# Patient Record
Sex: MALE | URBAN - METROPOLITAN AREA
[De-identification: names, ages, dates, MRNs, and addresses within clinical notes are randomized per-mention and may not be internally consistent; named-entity substitution may affect disease eponyms.]

---

## 2019-03-01 ENCOUNTER — APPOINTMENT (OUTPATIENT)
Dept: URBAN - METROPOLITAN AREA CLINIC 319 | Age: 59
Setting detail: DERMATOLOGY
End: 2019-03-01

## 2019-03-01 DIAGNOSIS — L72.8 OTHER FOLLICULAR CYSTS OF THE SKIN AND SUBCUTANEOUS TISSUE: ICD-10-CM

## 2019-03-01 PROCEDURE — OTHER MIPS QUALITY: OTHER

## 2019-03-01 PROCEDURE — OTHER PRESCRIPTION: OTHER

## 2019-03-01 PROCEDURE — OTHER TREATMENT REGIMEN: OTHER

## 2019-03-01 PROCEDURE — OTHER INTRALESIONAL KENALOG: OTHER

## 2019-03-01 PROCEDURE — OTHER COUNSELING: OTHER

## 2019-03-01 PROCEDURE — 11900 INJECT SKIN LESIONS </W 7: CPT

## 2019-03-01 RX ORDER — SULFAMETHOXAZOLE AND TRIMETHOPRIM 800; 160 MG/1; MG/1
TABLET ORAL
Qty: 20 | Refills: 0 | Status: ERX | COMMUNITY
Start: 2019-03-01

## 2019-03-01 ASSESSMENT — LOCATION ZONE DERM: LOCATION ZONE: TRUNK

## 2019-03-01 ASSESSMENT — LOCATION DETAILED DESCRIPTION DERM: LOCATION DETAILED: SUPRAPUBIC SKIN

## 2019-03-01 ASSESSMENT — LOCATION SIMPLE DESCRIPTION DERM: LOCATION SIMPLE: GROIN

## 2019-03-01 NOTE — PROCEDURE: INTRALESIONAL KENALOG
X Size Of Lesion In Cm (Optional): 0
Detail Level: Detailed
Include Z78.9 (Other Specified Conditions Influencing Health Status) As An Associated Diagnosis?: No
Administered By (Optional): Dr. Umana
Kenalog Preparation: Kenalog in bacteriostatic water
Total Volume Injected (Ccs- Only Use Numbers And Decimals): .5
Medical Necessity Clause: This procedure was medically necessary because the lesions that were treated were:
Concentration Of Solution Injected (Mg/Ml): 6.5

## 2019-04-10 ENCOUNTER — APPOINTMENT (OUTPATIENT)
Dept: URBAN - METROPOLITAN AREA CLINIC 319 | Age: 59
Setting detail: DERMATOLOGY
End: 2019-04-10

## 2019-04-10 DIAGNOSIS — L72.8 OTHER FOLLICULAR CYSTS OF THE SKIN AND SUBCUTANEOUS TISSUE: ICD-10-CM

## 2019-04-10 PROCEDURE — OTHER PRESCRIPTION: OTHER

## 2019-04-10 PROCEDURE — OTHER INTRALESIONAL KENALOG: OTHER

## 2019-04-10 PROCEDURE — OTHER COUNSELING: OTHER

## 2019-04-10 PROCEDURE — 11900 INJECT SKIN LESIONS </W 7: CPT

## 2019-04-10 RX ORDER — SULFAMETHOXAZOLE AND TRIMETHOPRIM 800; 160 MG/1; MG/1
TABLET ORAL
Qty: 20 | Refills: 0 | Status: ERX

## 2019-04-10 ASSESSMENT — LOCATION ZONE DERM: LOCATION ZONE: TRUNK

## 2019-04-10 ASSESSMENT — LOCATION SIMPLE DESCRIPTION DERM: LOCATION SIMPLE: GROIN

## 2019-04-10 ASSESSMENT — LOCATION DETAILED DESCRIPTION DERM
LOCATION DETAILED: LEFT INGUINAL CREASE
LOCATION DETAILED: SUPRAPUBIC SKIN

## 2019-04-10 NOTE — PROCEDURE: INTRALESIONAL KENALOG
Concentration Of Solution Injected (Mg/Ml): 6.5
Total Volume Injected (Ccs- Only Use Numbers And Decimals): 3
X Size Of Lesion In Cm (Optional): 0
Include Z78.9 (Other Specified Conditions Influencing Health Status) As An Associated Diagnosis?: No
Administered By (Optional): Dr. Umana
Kenalog Preparation: Kenalog in bacteriostatic water
Detail Level: Detailed
Medical Necessity Clause: This procedure was medically necessary because the lesions that were treated were:

## 2020-12-01 ENCOUNTER — APPOINTMENT (OUTPATIENT)
Dept: URBAN - METROPOLITAN AREA CLINIC 319 | Age: 60
Setting detail: DERMATOLOGY
End: 2020-12-01

## 2020-12-01 DIAGNOSIS — L91.8 OTHER HYPERTROPHIC DISORDERS OF THE SKIN: ICD-10-CM

## 2020-12-01 PROCEDURE — OTHER SKIN TAG REMOVAL (COSMETIC): OTHER

## 2020-12-01 PROCEDURE — OTHER COUNSELING: OTHER

## 2020-12-01 ASSESSMENT — LOCATION ZONE DERM: LOCATION ZONE: AXILLAE

## 2020-12-01 ASSESSMENT — LOCATION DETAILED DESCRIPTION DERM
LOCATION DETAILED: LEFT AXILLARY VAULT
LOCATION DETAILED: RIGHT AXILLARY VAULT

## 2020-12-01 ASSESSMENT — LOCATION SIMPLE DESCRIPTION DERM
LOCATION SIMPLE: RIGHT AXILLARY VAULT
LOCATION SIMPLE: LEFT AXILLARY VAULT

## 2020-12-01 NOTE — PROCEDURE: SKIN TAG REMOVAL (COSMETIC)
Consent: Written consent obtained and the risks of skin tag removal was reviewed with the patient including but not limited to bleeding, pigmentary change, infection, pain, and remote possibility of scarring.
Anesthesia Volume In Cc: 0
Detail Level: Detailed
Removed With: liquid nitrogen
Price (Use Numbers Only, No Special Characters Or $): 100.00